# Patient Record
Sex: MALE | Race: WHITE | Employment: PART TIME | URBAN - NONMETROPOLITAN AREA
[De-identification: names, ages, dates, MRNs, and addresses within clinical notes are randomized per-mention and may not be internally consistent; named-entity substitution may affect disease eponyms.]

---
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
## 2025-06-24 ENCOUNTER — HOSPITAL ENCOUNTER (EMERGENCY)
Age: 20
Discharge: HOME | End: 2025-06-24
Payer: COMMERCIAL

## 2025-06-24 VITALS
SYSTOLIC BLOOD PRESSURE: 122 MMHG | RESPIRATION RATE: 16 BRPM | OXYGEN SATURATION: 98 % | DIASTOLIC BLOOD PRESSURE: 78 MMHG | HEART RATE: 70 BPM | TEMPERATURE: 97.9 F

## 2025-06-24 DIAGNOSIS — K62.5 RECTAL BLEED: Primary | ICD-10-CM

## 2025-06-24 LAB
BUN BLD-MCNC: 9 MG/DL (ref 8–26)
CHLORIDE BLD-SCNC: 105 MEQ/L (ref 98–109)
CO2 BLD CALC-SCNC: 24 MEQ/L (ref 23–33)
CREAT BLD-MCNC: 0.9 MG/DL (ref 0.5–1.2)
ERYTHROCYTE [DISTWIDTH] IN BLOOD BY AUTOMATED COUNT: 13.1 % (ref 11.5–14.5)
GFR SERPL CREATININE-BSD FRML MDRD: > 90 ML/MIN/1.73M2
GLUCOSE BLD-MCNC: 99 MG/DL (ref 70–108)
HCT VFR BLD AUTO: 44.5 % (ref 42–52)
HGB BLD-MCNC: 16.1 GM/DL (ref 14–18)
MCH RBC QN AUTO: 29.9 PG (ref 27–31)
MCHC RBC AUTO-ENTMCNC: 36.2 GM/DL (ref 33–37)
MCV RBC AUTO: 83 FL (ref 80–94)
PLATELET # BLD AUTO: 233 THOU/MM3 (ref 130–400)
PMV BLD AUTO: 10.3 FL (ref 7.4–10.4)
POTASSIUM BLD-SCNC: 4 MEQ/L (ref 3.5–4.9)
RBC # BLD AUTO: 5.38 MILL/MM3 (ref 4.7–6.1)
SODIUM BLD-SCNC: 140 MEQ/L (ref 138–146)
WBC # BLD AUTO: 5.5 THOU/MM3 (ref 4.8–10.8)

## 2025-06-24 PROCEDURE — 99213 OFFICE O/P EST LOW 20 MIN: CPT

## 2025-06-24 PROCEDURE — 82565 ASSAY OF CREATININE: CPT

## 2025-06-24 PROCEDURE — 84520 ASSAY OF UREA NITROGEN: CPT

## 2025-06-24 PROCEDURE — 80051 ELECTROLYTE PANEL: CPT

## 2025-06-24 PROCEDURE — 85027 COMPLETE CBC AUTOMATED: CPT

## 2025-06-24 PROCEDURE — 36415 COLL VENOUS BLD VENIPUNCTURE: CPT

## 2025-06-24 PROCEDURE — 82947 ASSAY GLUCOSE BLOOD QUANT: CPT

## 2025-06-24 RX ORDER — LIDOCAINE HYDROCHLORIDE AND HYDROCORTISONE ACETATE 30; 5 MG/G; MG/G
1 CREAM RECTAL 2 TIMES DAILY PRN
Qty: 1 KIT | Refills: 0 | Status: SHIPPED | OUTPATIENT
Start: 2025-06-24

## 2025-06-24 ASSESSMENT — PAIN - FUNCTIONAL ASSESSMENT: PAIN_FUNCTIONAL_ASSESSMENT: NONE - DENIES PAIN

## 2025-06-24 NOTE — DISCHARGE INSTRUCTIONS
Your blood work was very reassuring.  You have normal blood levels indicating no signs of acute bleed.  Additionally your vitals are very stable not indicating any acute bleed.    You are most likely experiencing hemorrhoid.  Please treat with prescribed hemorrhoid cream.    If you experience ongoing bleeding please call GI for follow-up appointment.      If you experiencing worse bleeding especially if the bowls are significantly red and bright red blood please call 911/go to ER.    I hope you are feeling better soon!

## 2025-06-24 NOTE — ED PROVIDER NOTES
[facility]RG[facility]ARE      URGENT CARE     Pt Name: Vasquez Macias  MRN: [id number]  Birthdate 2005  Date of evaluation: 6/24/2025  Provider: BLADE Olmstead - CNP    Urgent Care Encounter     CHIEF COMPLAINT       Chief Complaint   Patient presents with    Rectal Bleeding     Onset 10 days off and on both bright red and dark red      HISTORY OF PRESENT ILLNESS   Vasquez Macias is a 20 y.o. male who presents to urgent care with chief complaint of blood from rectum.  Started about a week ago, intermittent in size.  Sometimes is seeing a little bit of blood on toilet paper and sometimes the bowl is red.  Described as sometimes bright and sometimes dark red.  Denies any itchiness or pain in the rectum.  Denies insertion of any foreign body into rectum or any anal sex.  Denies history of hemorrhoids.  Denies feeling any hemorrhoids.  Denies being on blood thinners.  Explains he had a lot of flu/cold medication last week because he was very ill but denies any excessive intake of Motrin/ibuprofen or other NSAIDs.  Denies nausea or vomiting.  Explains he has decreased appetite.  Describes bowel patterns as mostly unchanged maybe a little bit of constipation.  Denies taking anything for symptoms.  Denies abdominal pain.  Denies historically seeing GI specialist or having colonoscopy in the past.    History obtained from patient    PAST MEDICAL HISTORY         Diagnosis Date    Seasonal allergies      SURGICALHISTORY     Patient  has a past surgical history that includes Dental surgery.  CURRENT MEDICATIONS       Previous Medications    No medications on file     ALLERGIES     Patient is has no known allergies.  Patients   There is no immunization history on file for this patient.  FAMILY HISTORY     Patient's family history is not on file.  SOCIAL HISTORY     Patient  reports that he has never smoked. He has never used smokeless tobacco. He reports that he does not drink alcohol and does not use